# Patient Record
Sex: FEMALE | Race: BLACK OR AFRICAN AMERICAN | NOT HISPANIC OR LATINO | URBAN - METROPOLITAN AREA
[De-identification: names, ages, dates, MRNs, and addresses within clinical notes are randomized per-mention and may not be internally consistent; named-entity substitution may affect disease eponyms.]

---

## 2018-11-24 ENCOUNTER — HOSPITAL ENCOUNTER (EMERGENCY)
Facility: HOSPITAL | Age: 63
Discharge: HOME | End: 2018-11-24
Attending: EMERGENCY MEDICINE
Payer: MEDICAID

## 2018-11-24 VITALS
WEIGHT: 204 LBS | HEIGHT: 65 IN | BODY MASS INDEX: 33.99 KG/M2 | DIASTOLIC BLOOD PRESSURE: 94 MMHG | SYSTOLIC BLOOD PRESSURE: 153 MMHG | RESPIRATION RATE: 18 BRPM | TEMPERATURE: 99 F | OXYGEN SATURATION: 100 % | HEART RATE: 82 BPM

## 2018-11-24 DIAGNOSIS — E07.9 THYROID MASS: ICD-10-CM

## 2018-11-24 DIAGNOSIS — R07.9 CHEST PAIN: ICD-10-CM

## 2018-11-24 DIAGNOSIS — R07.9 ACUTE CHEST PAIN: Primary | ICD-10-CM

## 2018-11-24 LAB
ALBUMIN SERPL BCP-MCNC: 4.1 G/DL
ALP SERPL-CCNC: 60 U/L
ALT SERPL W/O P-5'-P-CCNC: 11 U/L
ANION GAP SERPL CALC-SCNC: 12 MMOL/L
AST SERPL-CCNC: 16 U/L
BASOPHILS # BLD AUTO: 0.01 K/UL
BASOPHILS NFR BLD: 0.1 %
BILIRUB SERPL-MCNC: 0.4 MG/DL
BNP SERPL-MCNC: 27 PG/ML
BUN SERPL-MCNC: 11 MG/DL
CALCIUM SERPL-MCNC: 10 MG/DL
CHLORIDE SERPL-SCNC: 106 MMOL/L
CO2 SERPL-SCNC: 24 MMOL/L
CREAT SERPL-MCNC: 0.9 MG/DL
DIFFERENTIAL METHOD: ABNORMAL
EOSINOPHIL # BLD AUTO: 0.1 K/UL
EOSINOPHIL NFR BLD: 1.4 %
ERYTHROCYTE [DISTWIDTH] IN BLOOD BY AUTOMATED COUNT: 13.5 %
EST. GFR  (AFRICAN AMERICAN): >60 ML/MIN/1.73 M^2
EST. GFR  (NON AFRICAN AMERICAN): >60 ML/MIN/1.73 M^2
GLUCOSE SERPL-MCNC: 86 MG/DL
HCT VFR BLD AUTO: 40.4 %
HGB BLD-MCNC: 13.6 G/DL
INR PPP: 1.1
LYMPHOCYTES # BLD AUTO: 3 K/UL
LYMPHOCYTES NFR BLD: 33.2 %
MCH RBC QN AUTO: 31.1 PG
MCHC RBC AUTO-ENTMCNC: 33.7 G/DL
MCV RBC AUTO: 92 FL
MONOCYTES # BLD AUTO: 0.7 K/UL
MONOCYTES NFR BLD: 7.1 %
NEUTROPHILS # BLD AUTO: 5.3 K/UL
NEUTROPHILS NFR BLD: 58.2 %
PLATELET # BLD AUTO: 386 K/UL
PMV BLD AUTO: 11.4 FL
POTASSIUM SERPL-SCNC: 3.4 MMOL/L
PROT SERPL-MCNC: 8.6 G/DL
PROTHROMBIN TIME: 11.4 SEC
RBC # BLD AUTO: 4.38 M/UL
SODIUM SERPL-SCNC: 142 MMOL/L
TROPONIN I SERPL DL<=0.01 NG/ML-MCNC: <0.006 NG/ML
WBC # BLD AUTO: 9.11 K/UL

## 2018-11-24 PROCEDURE — 25000003 PHARM REV CODE 250: Performed by: EMERGENCY MEDICINE

## 2018-11-24 PROCEDURE — 83880 ASSAY OF NATRIURETIC PEPTIDE: CPT

## 2018-11-24 PROCEDURE — 93005 ELECTROCARDIOGRAM TRACING: CPT

## 2018-11-24 PROCEDURE — 85025 COMPLETE CBC W/AUTO DIFF WBC: CPT

## 2018-11-24 PROCEDURE — 25500020 PHARM REV CODE 255: Performed by: EMERGENCY MEDICINE

## 2018-11-24 PROCEDURE — 85610 PROTHROMBIN TIME: CPT

## 2018-11-24 PROCEDURE — 96361 HYDRATE IV INFUSION ADD-ON: CPT

## 2018-11-24 PROCEDURE — 93010 ELECTROCARDIOGRAM REPORT: CPT | Mod: ,,, | Performed by: INTERNAL MEDICINE

## 2018-11-24 PROCEDURE — 99285 EMERGENCY DEPT VISIT HI MDM: CPT | Mod: 25

## 2018-11-24 PROCEDURE — 80053 COMPREHEN METABOLIC PANEL: CPT

## 2018-11-24 PROCEDURE — 84484 ASSAY OF TROPONIN QUANT: CPT

## 2018-11-24 PROCEDURE — 96360 HYDRATION IV INFUSION INIT: CPT | Mod: 59

## 2018-11-24 RX ORDER — PANTOPRAZOLE SODIUM 40 MG/1
40 TABLET, DELAYED RELEASE ORAL DAILY
Qty: 30 TABLET | Refills: 0 | Status: SHIPPED | OUTPATIENT
Start: 2018-11-24 | End: 2018-11-24 | Stop reason: SDUPTHER

## 2018-11-24 RX ORDER — PANTOPRAZOLE SODIUM 40 MG/1
TABLET, DELAYED RELEASE ORAL
Qty: 30 TABLET | Refills: 0 | Status: SHIPPED | OUTPATIENT
Start: 2018-11-24

## 2018-11-24 RX ORDER — DOCUSATE SODIUM 100 MG/1
100 CAPSULE, LIQUID FILLED ORAL 2 TIMES DAILY
COMMUNITY

## 2018-11-24 RX ORDER — GLIPIZIDE 5 MG/1
5 TABLET, FILM COATED, EXTENDED RELEASE ORAL
COMMUNITY

## 2018-11-24 RX ORDER — LOSARTAN POTASSIUM 100 MG/1
100 TABLET ORAL DAILY
COMMUNITY

## 2018-11-24 RX ORDER — ATENOLOL 25 MG/1
25 TABLET ORAL DAILY
COMMUNITY

## 2018-11-24 RX ORDER — PRAVASTATIN SODIUM 20 MG/1
20 TABLET ORAL DAILY
COMMUNITY

## 2018-11-24 RX ORDER — POTASSIUM CHLORIDE 1.5 G/1.58G
20 POWDER, FOR SOLUTION ORAL 2 TIMES DAILY
COMMUNITY

## 2018-11-24 RX ADMIN — IOHEXOL 85 ML: 350 INJECTION, SOLUTION INTRAVENOUS at 01:11

## 2018-11-24 RX ADMIN — SODIUM CHLORIDE 1000 ML: 0.9 INJECTION, SOLUTION INTRAVENOUS at 12:11

## 2018-11-24 NOTE — ED TRIAGE NOTES
C/o mid sternal intermittent  chest pain. Pt describes pain as heavy, no complaints of n/v pain does not radiate. C/o right flank pain when she moves. No medications taken PTA

## 2018-11-24 NOTE — ED PROVIDER NOTES
Encounter Date: 11/24/2018    SCRIBE #1 NOTE: I, Nicole Peterson, am scribing for, and in the presence of,  Mauri Dacosta MD. I have scribed the following portions of the note - Other sections scribed: HPI and ROS.       History     Chief Complaint   Patient presents with    Chest Pain     chest pain x2 days. worse this morning. thoguht it was acid reflux, symptoms have not improved. denies n/v.      CC: Chest Pain    HPI: This 63 y.o female who has HTN, DM, HLD, and Arthritis presents to the ED for an evaluation of acute onset, intermittent mid substernal chest pain for the past 3 days.  She described the pain as a pressure and heaviness.  She also reports of nausea, sweats, and right flank pain.  She reports episodes typically last 2 hours in duration during its onset.  Patient denies fever, chills, cough, rhinorrhea, rash, shortness of breath, extremity numbness, extremity weakness, or any other associated symptoms.  Patient reports having similar symptoms in June and reports having a negative stress test.  No prior tx.  No alleviating factors.        The history is provided by the patient. No  was used.     Review of patient's allergies indicates:   Allergen Reactions    Aspirin Nausea Only    Crestor [rosuvastatin]      Ulcer      Lisinopril      Cough       Past Medical History:   Diagnosis Date    Arthritis     Diabetes mellitus     Hyperlipidemia     Hypertension     Tachycardia      Past Surgical History:   Procedure Laterality Date    CHOLECYSTECTOMY      HYSTERECTOMY      THYROIDECTOMY      right     No family history on file.  Social History     Tobacco Use    Smoking status: Former Smoker   Substance Use Topics    Alcohol use: No     Frequency: Never    Drug use: No     Review of Systems   Constitutional: Positive for diaphoresis. Negative for chills and fever.   HENT: Negative for ear pain and sore throat.    Eyes: Negative for pain.   Respiratory: Negative for cough and  shortness of breath.    Cardiovascular: Positive for chest pain.   Gastrointestinal: Positive for nausea. Negative for abdominal pain, diarrhea and vomiting.   Genitourinary: Positive for flank pain. Negative for dysuria.   Musculoskeletal: Negative for back pain.        (-) arm or leg problems   Skin: Negative for rash.   Neurological: Negative for headaches.       Physical Exam     Initial Vitals [11/24/18 1101]   BP Pulse Resp Temp SpO2   (!) 185/87 94 18 98.9 °F (37.2 °C) 100 %      MAP       --         Physical Exam  The patient was examined specifically for the following:   General:No significant distress, Good color, Warm and dry. Head and neck:Scalp atraumatic, Neck supple. Neurological:Appropriate conversation, Gross motor deficits. Eyes:Conjugate gaze, Clear corneas. ENT: No epistaxis. Cardiac: Regular rate and rhythm, Grossly normal heart tones. Pulmonary: Wheezing, Rales. Gastrointestinal: Abdominal tenderness, Abdominal distention. Musculoskeletal: Extremity deformity, Apparent pain with range of motion of the joints. Skin: Rash.   The findings on examination were normal.  Lungs are clear.  The heart tones are normal.  The abdomen is soft.  Extremities are nontender.  The patient is in no distress.  She is warm and dry.  ED Course   Procedures  Labs Reviewed   CBC W/ AUTO DIFFERENTIAL - Abnormal; Notable for the following components:       Result Value    MCH 31.1 (*)     Platelets 386 (*)     All other components within normal limits   COMPREHENSIVE METABOLIC PANEL - Abnormal; Notable for the following components:    Potassium 3.4 (*)     Total Protein 8.6 (*)     All other components within normal limits   TROPONIN I   B-TYPE NATRIURETIC PEPTIDE   PROTIME-INR     EKG Readings: (Independently Interpreted)   This patient is in a normal sinus rhythm with a heart rate of 78.  The p.r. QRS and QT intervals are normal.  There is no definite evidence of acute myocardial infarction or malignant arrhythmia.        Imaging Results          CT Chest With Contrast (Final result)  Result time 11/24/18 13:52:07    Final result by Morris Newby MD (11/24/18 13:52:07)                 Impression:      1. Prominent heterogeneous enlargement of the thyroid with substernal component corresponding to radiographic abnormality.  Finding likely represents multinodular goiter with malignancy less likely.  Nonemergent thyroid ultrasound and possible tissue sampling recommended.  2. Esophageal wall thickening suspected related to mild esophagitis.  Correlate clinically and with endoscopy.      Electronically signed by: Morris Newby MD  Date:    11/24/2018  Time:    13:52             Narrative:    EXAMINATION:  CT CHEST WITH CONTRAST    CLINICAL HISTORY:  Lung mass;    TECHNIQUE:  Low dose axial images, sagittal and coronal reformations were obtained from the thoracic inlet to the lung bases following the IV administration of 75 mL of Omnipaque 350.    COMPARISON:  None    FINDINGS:  Thoracic aorta and great vessels are unremarkable.  Heart is normal without chamber enlargement.  No pericardial or pleural effusion.  No mediastinal, hilar or axillary adenopathy.    Lungs are clear.    Visualized upper abdominal structures are unremarkable.  Osseous structures are intact with degenerative findings of the spine.    There is possible circumferential wall thickening of the mid esophagus with distal esophagus decompressed limiting evaluation.    There is prominent heterogeneous enlargement of the right lobe of the thyroid with large substernal component extending anterior and to the right of the trachea.  This correlates with recent radiograph abnormality.                               X-Ray Chest PA And Lateral (Final result)     Abnormal  Result time 11/24/18 12:08:35    Final result by Randy Rodríguez MD (11/24/18 12:08:35)                 Impression:      No radiographic acute intrathoracic process seen.    Significant  thickening with convex appearance of the right paratracheal stripe.  This could be secondary to ectatic vasculature commonly seen in this age group versus lymphadenopathy or other nonspecific mediastinal mass.  Correlate clinically and with any prior imaging from outside facility if/when available.  Further evaluation with elective/nonemergent CT thorax, preferably with contrast can be obtained as warranted.    This report was flagged in Epic as abnormal.      Electronically signed by: Randy Rodríguez MD  Date:    11/24/2018  Time:    12:08             Narrative:    EXAMINATION:  XR CHEST PA AND LATERAL    CLINICAL HISTORY:  Chest Pain;    TECHNIQUE:  PA and lateral views of the chest were performed.    COMPARISON:  None    FINDINGS:  Resolution is somewhat limited by body habitus with underpenetration.  Heart is normal in size.  Pulmonary vasculature and hilar regions are within normal limits.  There is probable tortuosity of the thoracic aorta.  There is convex appearance with thickening of the right paratracheal stripe up to 4.5 cm.  The lungs are otherwise symmetrically well expanded without focal consolidation, pleural effusion or pneumothorax.  Osseous structures show age-related mild degenerative change without acute or destructive process seen.                              Medical decision making:  Given the above this patient presents to the emergency room with low sternal chest pain.  The patient had a mediastinal mass identified on chest x-ray CT of the chest reveals what is thought to be a thyroid lesion.  The patient also has some suggestion esophagitis on the CT chest.  Patient had a negative stress test last June.  I discussed this case with , who feels that the patient can be discharged to outpatient evaluation and treatment.  I will treat the patient for esophagitis and refer her to follow up with Cardiology.        Additional MDM:   Heart Score:    History:          Slightly suspicious.  ECG:              Normal  Age:               45-65 years  Risk factors: >= 3 risk factors or history of atherosclerotic disease  Troponin:       Less than or equal to normal limit  Final Score: 3             Scribe Attestation:   Scribe #1: I performed the above scribed service and the documentation accurately describes the services I performed. I attest to the accuracy of the note.    Attending Attestation:           Physician Attestation for Scribe:  Physician Attestation Statement for Scribe #1: I, Mauri Dacosta MD, reviewed documentation, as scribed by Nicole Peterson in my presence, and it is both accurate and complete.                    Clinical Impression:   The primary encounter diagnosis was Acute chest pain. Diagnoses of Chest pain and Thyroid mass were also pertinent to this visit.                             Mauri Dacosta MD  11/24/18 [id number]

## 2018-11-24 NOTE — DISCHARGE INSTRUCTIONS
Please return immediately if you get worse or if new problems develop.  Please follow-up with your primary care doctor this week about your thyroid mass. Please follow-up with cardiology about her chest pain. Please take the pantoprazole as directed.  Mylanta 30 mL by mouth every 4 hr whenever you have chest discomfort.  Had please avoid caffeine carbonation alcohol cigarette citrus tomato.  Rest.  No late meals.